# Patient Record
Sex: FEMALE | Race: WHITE | NOT HISPANIC OR LATINO | Employment: UNEMPLOYED | URBAN - METROPOLITAN AREA
[De-identification: names, ages, dates, MRNs, and addresses within clinical notes are randomized per-mention and may not be internally consistent; named-entity substitution may affect disease eponyms.]

---

## 2023-10-05 ENCOUNTER — TELEPHONE (OUTPATIENT)
Dept: OBSTETRICS AND GYNECOLOGY | Facility: CLINIC | Age: 28
End: 2023-10-05
Payer: MEDICAID

## 2023-10-05 NOTE — TELEPHONE ENCOUNTER
----- Message from Duyen Simpson sent at 10/5/2023 11:12 AM CDT -----  Type:  Sooner Appointment Request    Caller is requesting a sooner appointment.  Caller declined first available appointment listed below.  Caller will not accept being placed on the waitlist and is requesting a message be sent to doctor.    Name of Caller:  pt  When is the first available appointment?  N/a  Symptoms:  preg confirm  Best Call Back Number:  [phone]    Additional Information:  please advise

## 2023-10-05 NOTE — TELEPHONE ENCOUNTER
Patient states she is 10 weeks pregnant. Seeing someone in [place]. Appt scheduled for 10- at 10:20 am, patient verb understanding.